# Patient Record
Sex: FEMALE | Race: OTHER
[De-identification: names, ages, dates, MRNs, and addresses within clinical notes are randomized per-mention and may not be internally consistent; named-entity substitution may affect disease eponyms.]

---

## 2021-06-28 ENCOUNTER — HOSPITAL ENCOUNTER (OUTPATIENT)
Dept: HOSPITAL 8 - RAD | Age: 41
Discharge: HOME | End: 2021-06-28
Attending: NURSE PRACTITIONER
Payer: COMMERCIAL

## 2021-06-28 DIAGNOSIS — M54.5: ICD-10-CM

## 2021-06-28 DIAGNOSIS — R31.9: Primary | ICD-10-CM

## 2021-06-28 PROCEDURE — 76770 US EXAM ABDO BACK WALL COMP: CPT

## 2023-10-14 ENCOUNTER — HOSPITAL ENCOUNTER (OUTPATIENT)
Dept: RADIOLOGY | Facility: MEDICAL CENTER | Age: 43
End: 2023-10-14
Attending: NURSE PRACTITIONER
Payer: COMMERCIAL

## 2023-10-14 DIAGNOSIS — N92.0 EXCESSIVE OR FREQUENT MENSTRUATION: ICD-10-CM

## 2023-10-14 PROCEDURE — 76830 TRANSVAGINAL US NON-OB: CPT

## 2023-12-29 ENCOUNTER — APPOINTMENT (OUTPATIENT)
Dept: ADMISSIONS | Facility: MEDICAL CENTER | Age: 43
End: 2023-12-29
Attending: SPECIALIST
Payer: COMMERCIAL

## 2024-01-03 NOTE — H&P
DATE OF ADMISSION:  2024     IDENTIFICATION:  The patient is a very pleasant 43-year-old primipara (para 1   with 1 previous  section).     CHIEF COMPLAINT:  The patient complains of menometrorrhagia/polymenorrhea.     HISTORY OF PRESENT ILLNESS:  The patient has for months now been complaining   of menometrorrhagia/polymenorrhea and pelvic ultrasound performed at [facility] on   10/14/2023 revealed a 2.2 cm submucosal fibroid and a 1.6 cm posterior   uterine wall intramural fibroid.  She is scheduled for a laparoscopic   supracervical hysterectomy with mini laparotomy and also bilateral   salpingectomy.     PAST MEDICAL HISTORY:  The patient has hypertension and hypertriglyceridemia   and also hypothyroidism.     PAST SURGICAL HISTORY:  The patient had a breast reduction and breast lift in   .  She has also undergone abdominoplasty.  She underwent  section   in .  She has had tooth extraction.     MEDICATIONS:  The patient takes hydrochlorothiazide and levothyroxine and   medication to treat hypertriglyceridemia.     ALLERGIES:  THE PATIENT SAYS SHE IS ALLERGIC TO IODINE BUT OTHERWISE HAS NO   KNOWN DRUG ALLERGIES.     SOCIAL HISTORY:  The patient denies smoking.  She only rarely consumes   alcoholic beverages.  She denies the use of recreational drugs.     REVIEW OF SYSTEMS:    GENERAL:  The patient denies any fevers or chills or sweats.  PULMONARY:  The patient denies any coughing or wheezing or chest pain or   shortness of breath.  CARDIOVASCULAR:  The patient denies any palpitations, dyspnea, chest pain.  GASTROINTESTINAL:  The patient denies any nausea, vomiting, diarrhea,   constipation.  GENITOURINARY:  The patient complains of menometrorrhagia/polymenorrhea.  MUSCULOSKELETAL:  The patient denies any arthralgias or myalgias.  NEUROLOGIC:  The patient denies any headaches or syncope or seizures.     PHYSICAL EXAMINATION:    VITAL SIGNS:  The patient's vital signs are stable and she  is afebrile.  Her   recent blood pressure 120/72.  Her height is 5 feet 5 inches and her weight is   176 pounds, which corresponds to a body mass index of 29.3.  GENERAL:  The patient appears well developed and well nourished and relaxed   and alert and comfortable and in no apparent distress.  HEENT:  Normocephalic, atraumatic.  Pupils are equal, round and reactive to   light and accommodation.  Extraocular motions intact.  Pharynx clear.  There   is no thyromegaly.  There is no cervical lymphadenopathy.  CHEST/HEART:  Regular rate and rhythm, no murmur.  LUNGS:  Clear to auscultation bilaterally.  ABDOMEN:  Examination of the patient's abdomen with the patient in the dorsal   supine position reveals surgical scars consistent with previous abdominoplasty   and reveals that the abdomen is soft and flat and nontender and nondistended.    There is no evidence of hepatomegaly and no evidence of splenomegaly.  EXTREMITIES:  No clubbing, cyanosis or edema.  NEUROLOGIC:  Nonfocal.     ASSESSMENT:  1.  Menorrhagia/menometrorrhagia/polymenorrhea.  2.  Submucosal uterine fibroid and also intramural uterine fibroid.     PLAN:  We will proceed with laparoscopic supracervical hysterectomy with mini   laparotomy and bilateral salpingectomy.  I have discussed with the patient and   explained to the patient in detail and at length what laparoscopic   supracervical hysterectomy with mini laparotomy and also bilateral   salpingectomy is and what laparoscopic supracervical hysterectomy with mini   laparotomy involves and I have discussed with the patient and explained to the   patient in detail and at length the risks and benefits and alternatives of   laparoscopic supracervical hysterectomy with mini laparotomy and bilateral   salpingectomy.  After our discussions and after answering her questions, she   told me that she very much wishes for us to proceed with laparoscopic   supracervical hysterectomy with mini laparotomy and  bilateral salpingectomy.        ______________________________  MD PATI Sheridan/JAZMIN    DD:  01/02/2024 17:50  DT:  01/02/2024 18:36    Job#:  [id number]

## 2024-01-04 ENCOUNTER — PRE-ADMISSION TESTING (OUTPATIENT)
Dept: ADMISSIONS | Facility: MEDICAL CENTER | Age: 44
End: 2024-01-04
Attending: SPECIALIST
Payer: COMMERCIAL

## 2024-01-04 DIAGNOSIS — Z01.810 PRE-OPERATIVE CARDIOVASCULAR EXAMINATION: ICD-10-CM

## 2024-01-04 DIAGNOSIS — Z01.812 PRE-OPERATIVE LABORATORY EXAMINATION: ICD-10-CM

## 2024-01-04 LAB
ANION GAP SERPL CALC-SCNC: 12 MMOL/L (ref 7–16)
BUN SERPL-MCNC: 14 MG/DL (ref 8–22)
CALCIUM SERPL-MCNC: 9.2 MG/DL (ref 8.5–10.5)
CHLORIDE SERPL-SCNC: 102 MMOL/L (ref 96–112)
CO2 SERPL-SCNC: 26 MMOL/L (ref 20–33)
CREAT SERPL-MCNC: 0.58 MG/DL (ref 0.5–1.4)
EKG IMPRESSION: NORMAL
ERYTHROCYTE [DISTWIDTH] IN BLOOD BY AUTOMATED COUNT: 45.5 FL (ref 35.9–50)
GFR SERPLBLD CREATININE-BSD FMLA CKD-EPI: 115 ML/MIN/1.73 M 2
GLUCOSE SERPL-MCNC: 75 MG/DL (ref 65–99)
HCT VFR BLD AUTO: 41.7 % (ref 37–47)
HGB BLD-MCNC: 13.7 G/DL (ref 12–16)
MCH RBC QN AUTO: 30.9 PG (ref 27–33)
MCHC RBC AUTO-ENTMCNC: 32.9 G/DL (ref 32.2–35.5)
MCV RBC AUTO: 93.9 FL (ref 81.4–97.8)
PLATELET # BLD AUTO: 368 K/UL (ref 164–446)
PMV BLD AUTO: 10 FL (ref 9–12.9)
POTASSIUM SERPL-SCNC: 3.9 MMOL/L (ref 3.6–5.5)
RBC # BLD AUTO: 4.44 M/UL (ref 4.2–5.4)
SODIUM SERPL-SCNC: 140 MMOL/L (ref 135–145)
WBC # BLD AUTO: 10.1 K/UL (ref 4.8–10.8)

## 2024-01-04 PROCEDURE — 93005 ELECTROCARDIOGRAM TRACING: CPT

## 2024-01-04 PROCEDURE — 36415 COLL VENOUS BLD VENIPUNCTURE: CPT

## 2024-01-04 PROCEDURE — 85027 COMPLETE CBC AUTOMATED: CPT

## 2024-01-04 PROCEDURE — 93010 ELECTROCARDIOGRAM REPORT: CPT | Performed by: INTERNAL MEDICINE

## 2024-01-04 PROCEDURE — 80048 BASIC METABOLIC PNL TOTAL CA: CPT

## 2024-01-04 RX ORDER — LOSARTAN POTASSIUM AND HYDROCHLOROTHIAZIDE 25; 100 MG/1; MG/1
1 TABLET ORAL
COMMUNITY
Start: 2023-12-01

## 2024-01-04 RX ORDER — FENOFIBRATE 160 MG/1
160 TABLET ORAL
COMMUNITY

## 2024-01-04 RX ORDER — NORETHINDRONE ACETATE AND ETHINYL ESTRADIOL, AND FERROUS FUMARATE 1MG-20(24)
1 KIT ORAL
Status: ON HOLD | COMMUNITY
Start: 2023-11-14 | End: 2024-01-08

## 2024-01-04 RX ORDER — VITAMIN B COMPLEX
1000 TABLET ORAL DAILY
COMMUNITY

## 2024-01-04 RX ORDER — LEVOTHYROXINE SODIUM 0.1 MG/1
100 TABLET ORAL
COMMUNITY

## 2024-01-08 ENCOUNTER — ANESTHESIA (OUTPATIENT)
Dept: SURGERY | Facility: MEDICAL CENTER | Age: 44
End: 2024-01-08
Payer: COMMERCIAL

## 2024-01-08 ENCOUNTER — HOSPITAL ENCOUNTER (OUTPATIENT)
Facility: MEDICAL CENTER | Age: 44
End: 2024-01-09
Attending: SPECIALIST | Admitting: SPECIALIST
Payer: COMMERCIAL

## 2024-01-08 ENCOUNTER — ANESTHESIA EVENT (OUTPATIENT)
Dept: SURGERY | Facility: MEDICAL CENTER | Age: 44
End: 2024-01-08
Payer: COMMERCIAL

## 2024-01-08 DIAGNOSIS — G89.18 POSTOPERATIVE PAIN: ICD-10-CM

## 2024-01-08 PROBLEM — Z90.711 STATUS POST LAPAROSCOPIC SUPRACERVICAL HYSTERECTOMY: Status: ACTIVE | Noted: 2024-01-08

## 2024-01-08 LAB
HCG UR QL: NEGATIVE
PATHOLOGY CONSULT NOTE: NORMAL

## 2024-01-08 PROCEDURE — 700105 HCHG RX REV CODE 258: Performed by: SPECIALIST

## 2024-01-08 PROCEDURE — 88307 TISSUE EXAM BY PATHOLOGIST: CPT

## 2024-01-08 PROCEDURE — 700101 HCHG RX REV CODE 250: Performed by: ANESTHESIOLOGY

## 2024-01-08 PROCEDURE — 700102 HCHG RX REV CODE 250 W/ 637 OVERRIDE(OP): Performed by: SPECIALIST

## 2024-01-08 PROCEDURE — 160035 HCHG PACU - 1ST 60 MINS PHASE I: Performed by: SPECIALIST

## 2024-01-08 PROCEDURE — 160009 HCHG ANES TIME/MIN: Performed by: SPECIALIST

## 2024-01-08 PROCEDURE — 700111 HCHG RX REV CODE 636 W/ 250 OVERRIDE (IP): Performed by: ANESTHESIOLOGY

## 2024-01-08 PROCEDURE — A9270 NON-COVERED ITEM OR SERVICE: HCPCS | Performed by: SPECIALIST

## 2024-01-08 PROCEDURE — G0378 HOSPITAL OBSERVATION PER HR: HCPCS

## 2024-01-08 PROCEDURE — 160002 HCHG RECOVERY MINUTES (STAT): Performed by: SPECIALIST

## 2024-01-08 PROCEDURE — 160048 HCHG OR STATISTICAL LEVEL 1-5: Performed by: SPECIALIST

## 2024-01-08 PROCEDURE — 160029 HCHG SURGERY MINUTES - 1ST 30 MINS LEVEL 4: Performed by: SPECIALIST

## 2024-01-08 PROCEDURE — 700101 HCHG RX REV CODE 250: Performed by: SPECIALIST

## 2024-01-08 PROCEDURE — 160041 HCHG SURGERY MINUTES - EA ADDL 1 MIN LEVEL 4: Performed by: SPECIALIST

## 2024-01-08 PROCEDURE — 81025 URINE PREGNANCY TEST: CPT

## 2024-01-08 RX ORDER — IBUPROFEN 400 MG/1
800 TABLET ORAL 3 TIMES DAILY PRN
Status: DISCONTINUED | OUTPATIENT
Start: 2024-01-13 | End: 2024-01-09 | Stop reason: HOSPADM

## 2024-01-08 RX ORDER — HALOPERIDOL 5 MG/ML
1 INJECTION INTRAMUSCULAR EVERY 6 HOURS PRN
Status: DISCONTINUED | OUTPATIENT
Start: 2024-01-08 | End: 2024-01-09 | Stop reason: HOSPADM

## 2024-01-08 RX ORDER — LIDOCAINE HYDROCHLORIDE 20 MG/ML
INJECTION, SOLUTION EPIDURAL; INFILTRATION; INTRACAUDAL; PERINEURAL PRN
Status: DISCONTINUED | OUTPATIENT
Start: 2024-01-08 | End: 2024-01-08 | Stop reason: SURG

## 2024-01-08 RX ORDER — EPHEDRINE SULFATE 50 MG/ML
5 INJECTION, SOLUTION INTRAVENOUS
Status: DISCONTINUED | OUTPATIENT
Start: 2024-01-08 | End: 2024-01-08 | Stop reason: HOSPADM

## 2024-01-08 RX ORDER — LABETALOL HYDROCHLORIDE 5 MG/ML
5 INJECTION, SOLUTION INTRAVENOUS
Status: DISCONTINUED | OUTPATIENT
Start: 2024-01-08 | End: 2024-01-08 | Stop reason: HOSPADM

## 2024-01-08 RX ORDER — IBUPROFEN 400 MG/1
800 TABLET ORAL 3 TIMES DAILY
Status: DISCONTINUED | OUTPATIENT
Start: 2024-01-08 | End: 2024-01-09 | Stop reason: HOSPADM

## 2024-01-08 RX ORDER — SODIUM CHLORIDE, SODIUM LACTATE, POTASSIUM CHLORIDE, CALCIUM CHLORIDE 600; 310; 30; 20 MG/100ML; MG/100ML; MG/100ML; MG/100ML
INJECTION, SOLUTION INTRAVENOUS CONTINUOUS
Status: DISCONTINUED | OUTPATIENT
Start: 2024-01-08 | End: 2024-01-08

## 2024-01-08 RX ORDER — ROCURONIUM BROMIDE 10 MG/ML
INJECTION, SOLUTION INTRAVENOUS PRN
Status: DISCONTINUED | OUTPATIENT
Start: 2024-01-08 | End: 2024-01-08 | Stop reason: SURG

## 2024-01-08 RX ORDER — KETOROLAC TROMETHAMINE 30 MG/ML
INJECTION, SOLUTION INTRAMUSCULAR; INTRAVENOUS PRN
Status: DISCONTINUED | OUTPATIENT
Start: 2024-01-08 | End: 2024-01-08 | Stop reason: SURG

## 2024-01-08 RX ORDER — EPINEPHRINE 1 MG/ML(1)
AMPUL (ML) INJECTION
Status: DISPENSED
Start: 2024-01-08 | End: 2024-01-08

## 2024-01-08 RX ORDER — ONDANSETRON 2 MG/ML
4 INJECTION INTRAMUSCULAR; INTRAVENOUS EVERY 4 HOURS PRN
Status: DISCONTINUED | OUTPATIENT
Start: 2024-01-08 | End: 2024-01-09 | Stop reason: HOSPADM

## 2024-01-08 RX ORDER — DIPHENHYDRAMINE HYDROCHLORIDE 50 MG/ML
12.5 INJECTION INTRAMUSCULAR; INTRAVENOUS
Status: DISCONTINUED | OUTPATIENT
Start: 2024-01-08 | End: 2024-01-08 | Stop reason: HOSPADM

## 2024-01-08 RX ORDER — CEFAZOLIN SODIUM 1 G/3ML
INJECTION, POWDER, FOR SOLUTION INTRAMUSCULAR; INTRAVENOUS PRN
Status: DISCONTINUED | OUTPATIENT
Start: 2024-01-08 | End: 2024-01-08 | Stop reason: SURG

## 2024-01-08 RX ORDER — POLYETHYLENE GLYCOL 3350 17 G/17G
1 POWDER, FOR SOLUTION ORAL 2 TIMES DAILY PRN
Status: DISCONTINUED | OUTPATIENT
Start: 2024-01-08 | End: 2024-01-09 | Stop reason: HOSPADM

## 2024-01-08 RX ORDER — DIPHENHYDRAMINE HYDROCHLORIDE 50 MG/ML
25 INJECTION INTRAMUSCULAR; INTRAVENOUS EVERY 6 HOURS PRN
Status: DISCONTINUED | OUTPATIENT
Start: 2024-01-08 | End: 2024-01-09 | Stop reason: HOSPADM

## 2024-01-08 RX ORDER — BISACODYL 10 MG
10 SUPPOSITORY, RECTAL RECTAL
Status: DISCONTINUED | OUTPATIENT
Start: 2024-01-08 | End: 2024-01-09 | Stop reason: HOSPADM

## 2024-01-08 RX ORDER — OXYCODONE HYDROCHLORIDE 5 MG/1
5 TABLET ORAL
Status: DISCONTINUED | OUTPATIENT
Start: 2024-01-08 | End: 2024-01-09 | Stop reason: HOSPADM

## 2024-01-08 RX ORDER — HYDROMORPHONE HYDROCHLORIDE 2 MG/ML
INJECTION, SOLUTION INTRAMUSCULAR; INTRAVENOUS; SUBCUTANEOUS PRN
Status: DISCONTINUED | OUTPATIENT
Start: 2024-01-08 | End: 2024-01-08 | Stop reason: SURG

## 2024-01-08 RX ORDER — MIDAZOLAM HYDROCHLORIDE 1 MG/ML
1 INJECTION INTRAMUSCULAR; INTRAVENOUS
Status: DISCONTINUED | OUTPATIENT
Start: 2024-01-08 | End: 2024-01-08 | Stop reason: HOSPADM

## 2024-01-08 RX ORDER — SIMETHICONE 125 MG
125 TABLET,CHEWABLE ORAL 3 TIMES DAILY PRN
Status: DISCONTINUED | OUTPATIENT
Start: 2024-01-08 | End: 2024-01-09 | Stop reason: HOSPADM

## 2024-01-08 RX ORDER — AMOXICILLIN 250 MG
1 CAPSULE ORAL NIGHTLY
Status: DISCONTINUED | OUTPATIENT
Start: 2024-01-08 | End: 2024-01-09 | Stop reason: HOSPADM

## 2024-01-08 RX ORDER — MORPHINE SULFATE 4 MG/ML
4 INJECTION INTRAVENOUS
Status: DISCONTINUED | OUTPATIENT
Start: 2024-01-08 | End: 2024-01-09 | Stop reason: HOSPADM

## 2024-01-08 RX ORDER — ACETAMINOPHEN 500 MG
1000 TABLET ORAL EVERY 6 HOURS PRN
Status: DISCONTINUED | OUTPATIENT
Start: 2024-01-13 | End: 2024-01-09 | Stop reason: HOSPADM

## 2024-01-08 RX ORDER — OXYCODONE HCL 5 MG/5 ML
5 SOLUTION, ORAL ORAL
Status: DISCONTINUED | OUTPATIENT
Start: 2024-01-08 | End: 2024-01-08 | Stop reason: HOSPADM

## 2024-01-08 RX ORDER — OXYCODONE HYDROCHLORIDE 10 MG/1
10 TABLET ORAL
Status: DISCONTINUED | OUTPATIENT
Start: 2024-01-08 | End: 2024-01-09 | Stop reason: HOSPADM

## 2024-01-08 RX ORDER — AMOXICILLIN 250 MG
1 CAPSULE ORAL
Status: DISCONTINUED | OUTPATIENT
Start: 2024-01-08 | End: 2024-01-09 | Stop reason: HOSPADM

## 2024-01-08 RX ORDER — DEXAMETHASONE SODIUM PHOSPHATE 4 MG/ML
INJECTION, SOLUTION INTRA-ARTICULAR; INTRALESIONAL; INTRAMUSCULAR; INTRAVENOUS; SOFT TISSUE PRN
Status: DISCONTINUED | OUTPATIENT
Start: 2024-01-08 | End: 2024-01-08 | Stop reason: SURG

## 2024-01-08 RX ORDER — OXYCODONE HCL 5 MG/5 ML
10 SOLUTION, ORAL ORAL
Status: DISCONTINUED | OUTPATIENT
Start: 2024-01-08 | End: 2024-01-08 | Stop reason: HOSPADM

## 2024-01-08 RX ORDER — MEPERIDINE HYDROCHLORIDE 25 MG/ML
6.25 INJECTION INTRAMUSCULAR; INTRAVENOUS; SUBCUTANEOUS
Status: DISCONTINUED | OUTPATIENT
Start: 2024-01-08 | End: 2024-01-08 | Stop reason: HOSPADM

## 2024-01-08 RX ORDER — ACETAMINOPHEN 500 MG
1000 TABLET ORAL EVERY 6 HOURS
Status: DISCONTINUED | OUTPATIENT
Start: 2024-01-08 | End: 2024-01-09 | Stop reason: HOSPADM

## 2024-01-08 RX ORDER — ONDANSETRON 2 MG/ML
INJECTION INTRAMUSCULAR; INTRAVENOUS PRN
Status: DISCONTINUED | OUTPATIENT
Start: 2024-01-08 | End: 2024-01-08 | Stop reason: SURG

## 2024-01-08 RX ORDER — HALOPERIDOL 5 MG/ML
1 INJECTION INTRAMUSCULAR
Status: DISCONTINUED | OUTPATIENT
Start: 2024-01-08 | End: 2024-01-08 | Stop reason: HOSPADM

## 2024-01-08 RX ORDER — SODIUM CHLORIDE, SODIUM LACTATE, POTASSIUM CHLORIDE, CALCIUM CHLORIDE 600; 310; 30; 20 MG/100ML; MG/100ML; MG/100ML; MG/100ML
INJECTION, SOLUTION INTRAVENOUS EVERY 6 HOURS
Status: COMPLETED | OUTPATIENT
Start: 2024-01-08 | End: 2024-01-08

## 2024-01-08 RX ORDER — DEXAMETHASONE SODIUM PHOSPHATE 4 MG/ML
4 INJECTION, SOLUTION INTRA-ARTICULAR; INTRALESIONAL; INTRAMUSCULAR; INTRAVENOUS; SOFT TISSUE
Status: DISCONTINUED | OUTPATIENT
Start: 2024-01-08 | End: 2024-01-09 | Stop reason: HOSPADM

## 2024-01-08 RX ORDER — HYDROMORPHONE HYDROCHLORIDE 1 MG/ML
0.4 INJECTION, SOLUTION INTRAMUSCULAR; INTRAVENOUS; SUBCUTANEOUS
Status: DISCONTINUED | OUTPATIENT
Start: 2024-01-08 | End: 2024-01-08 | Stop reason: HOSPADM

## 2024-01-08 RX ORDER — DEXMEDETOMIDINE HYDROCHLORIDE 100 UG/ML
INJECTION, SOLUTION INTRAVENOUS PRN
Status: DISCONTINUED | OUTPATIENT
Start: 2024-01-08 | End: 2024-01-08 | Stop reason: SURG

## 2024-01-08 RX ORDER — BUPIVACAINE HYDROCHLORIDE 2.5 MG/ML
INJECTION, SOLUTION EPIDURAL; INFILTRATION; INTRACAUDAL
Status: DISPENSED
Start: 2024-01-08 | End: 2024-01-08

## 2024-01-08 RX ORDER — HYDROMORPHONE HYDROCHLORIDE 1 MG/ML
0.1 INJECTION, SOLUTION INTRAMUSCULAR; INTRAVENOUS; SUBCUTANEOUS
Status: DISCONTINUED | OUTPATIENT
Start: 2024-01-08 | End: 2024-01-08 | Stop reason: HOSPADM

## 2024-01-08 RX ORDER — ENEMA 19; 7 G/133ML; G/133ML
1 ENEMA RECTAL
Status: DISCONTINUED | OUTPATIENT
Start: 2024-01-08 | End: 2024-01-09 | Stop reason: HOSPADM

## 2024-01-08 RX ORDER — BUPIVACAINE HYDROCHLORIDE AND EPINEPHRINE 2.5; 5 MG/ML; UG/ML
INJECTION, SOLUTION EPIDURAL; INFILTRATION; INTRACAUDAL; PERINEURAL
Status: DISCONTINUED | OUTPATIENT
Start: 2024-01-08 | End: 2024-01-08 | Stop reason: HOSPADM

## 2024-01-08 RX ORDER — DOCUSATE SODIUM 100 MG/1
100 CAPSULE, LIQUID FILLED ORAL 2 TIMES DAILY
Status: DISCONTINUED | OUTPATIENT
Start: 2024-01-08 | End: 2024-01-09 | Stop reason: HOSPADM

## 2024-01-08 RX ORDER — ONDANSETRON 2 MG/ML
4 INJECTION INTRAMUSCULAR; INTRAVENOUS
Status: DISCONTINUED | OUTPATIENT
Start: 2024-01-08 | End: 2024-01-08 | Stop reason: HOSPADM

## 2024-01-08 RX ORDER — SCOLOPAMINE TRANSDERMAL SYSTEM 1 MG/1
1 PATCH, EXTENDED RELEASE TRANSDERMAL
Status: DISCONTINUED | OUTPATIENT
Start: 2024-01-08 | End: 2024-01-09 | Stop reason: HOSPADM

## 2024-01-08 RX ORDER — HYDROMORPHONE HYDROCHLORIDE 1 MG/ML
0.2 INJECTION, SOLUTION INTRAMUSCULAR; INTRAVENOUS; SUBCUTANEOUS
Status: DISCONTINUED | OUTPATIENT
Start: 2024-01-08 | End: 2024-01-08 | Stop reason: HOSPADM

## 2024-01-08 RX ADMIN — LIDOCAINE HYDROCHLORIDE 100 MG: 20 INJECTION, SOLUTION EPIDURAL; INFILTRATION; INTRACAUDAL at 10:04

## 2024-01-08 RX ADMIN — DEXAMETHASONE SODIUM PHOSPHATE 8 MG: 4 INJECTION INTRA-ARTICULAR; INTRALESIONAL; INTRAMUSCULAR; INTRAVENOUS; SOFT TISSUE at 10:04

## 2024-01-08 RX ADMIN — ACETAMINOPHEN 1000 MG: 500 TABLET ORAL at 17:12

## 2024-01-08 RX ADMIN — HYDROMORPHONE HYDROCHLORIDE 0.4 MG: 2 INJECTION INTRAMUSCULAR; INTRAVENOUS; SUBCUTANEOUS at 11:06

## 2024-01-08 RX ADMIN — ONDANSETRON 4 MG: 2 INJECTION INTRAMUSCULAR; INTRAVENOUS at 10:04

## 2024-01-08 RX ADMIN — ROCURONIUM BROMIDE 25 MG: 50 INJECTION, SOLUTION INTRAVENOUS at 11:01

## 2024-01-08 RX ADMIN — DEXMEDETOMIDINE 20 MCG: 100 INJECTION, SOLUTION INTRAVENOUS at 12:20

## 2024-01-08 RX ADMIN — SODIUM CHLORIDE, POTASSIUM CHLORIDE, SODIUM LACTATE AND CALCIUM CHLORIDE: 600; 310; 30; 20 INJECTION, SOLUTION INTRAVENOUS at 09:58

## 2024-01-08 RX ADMIN — SODIUM CHLORIDE, POTASSIUM CHLORIDE, SODIUM LACTATE AND CALCIUM CHLORIDE: 600; 310; 30; 20 INJECTION, SOLUTION INTRAVENOUS at 17:14

## 2024-01-08 RX ADMIN — CEFAZOLIN 2 G: 1 INJECTION, POWDER, FOR SOLUTION INTRAMUSCULAR; INTRAVENOUS at 10:06

## 2024-01-08 RX ADMIN — PROPOFOL 140 MG: 10 INJECTION, EMULSION INTRAVENOUS at 10:04

## 2024-01-08 RX ADMIN — ROCURONIUM BROMIDE 5 MG: 50 INJECTION, SOLUTION INTRAVENOUS at 11:58

## 2024-01-08 RX ADMIN — KETOROLAC TROMETHAMINE 30 MG: 30 INJECTION, SOLUTION INTRAMUSCULAR; INTRAVENOUS at 11:58

## 2024-01-08 RX ADMIN — ACETAMINOPHEN 1000 MG: 500 TABLET ORAL at 23:16

## 2024-01-08 RX ADMIN — HYDROMORPHONE HYDROCHLORIDE 0.2 MG: 2 INJECTION INTRAMUSCULAR; INTRAVENOUS; SUBCUTANEOUS at 11:58

## 2024-01-08 RX ADMIN — ROCURONIUM BROMIDE 50 MG: 50 INJECTION, SOLUTION INTRAVENOUS at 10:04

## 2024-01-08 RX ADMIN — IBUPROFEN 800 MG: 400 TABLET, FILM COATED ORAL at 17:17

## 2024-01-08 RX ADMIN — HYDROMORPHONE HYDROCHLORIDE 0.4 MG: 2 INJECTION INTRAMUSCULAR; INTRAVENOUS; SUBCUTANEOUS at 10:08

## 2024-01-08 RX ADMIN — SUGAMMADEX 200 MG: 100 INJECTION, SOLUTION INTRAVENOUS at 12:32

## 2024-01-08 ASSESSMENT — PAIN DESCRIPTION - PAIN TYPE
TYPE: SURGICAL PAIN;ACUTE PAIN
TYPE: ACUTE PAIN;SURGICAL PAIN
TYPE: SURGICAL PAIN
TYPE: ACUTE PAIN;SURGICAL PAIN

## 2024-01-08 ASSESSMENT — COGNITIVE AND FUNCTIONAL STATUS - GENERAL
MOVING TO AND FROM BED TO CHAIR: A LITTLE
MOBILITY SCORE: 19
DAILY ACTIVITIY SCORE: 21
DRESSING REGULAR LOWER BODY CLOTHING: A LITTLE
CLIMB 3 TO 5 STEPS WITH RAILING: A LITTLE
HELP NEEDED FOR BATHING: A LITTLE
MOVING FROM LYING ON BACK TO SITTING ON SIDE OF FLAT BED: A LITTLE
SUGGESTED CMS G CODE MODIFIER DAILY ACTIVITY: CJ
WALKING IN HOSPITAL ROOM: A LITTLE
STANDING UP FROM CHAIR USING ARMS: A LITTLE
TOILETING: A LITTLE
SUGGESTED CMS G CODE MODIFIER MOBILITY: CK

## 2024-01-08 ASSESSMENT — LIFESTYLE VARIABLES
CONSUMPTION TOTAL: NEGATIVE
HAVE YOU EVER FELT YOU SHOULD CUT DOWN ON YOUR DRINKING: NO
EVER FELT BAD OR GUILTY ABOUT YOUR DRINKING: NO
AVERAGE NUMBER OF DAYS PER WEEK YOU HAVE A DRINK CONTAINING ALCOHOL: 0
EVER HAD A DRINK FIRST THING IN THE MORNING TO STEADY YOUR NERVES TO GET RID OF A HANGOVER: NO
DOES PATIENT WANT TO STOP DRINKING: NO
ALCOHOL_USE: NO
ON A TYPICAL DAY WHEN YOU DRINK ALCOHOL HOW MANY DRINKS DO YOU HAVE: 0
TOTAL SCORE: 0
HOW MANY TIMES IN THE PAST YEAR HAVE YOU HAD 5 OR MORE DRINKS IN A DAY: 0
TOTAL SCORE: 0
TOTAL SCORE: 0
HAVE PEOPLE ANNOYED YOU BY CRITICIZING YOUR DRINKING: NO

## 2024-01-08 ASSESSMENT — PAIN SCALES - GENERAL: PAIN_LEVEL: 2

## 2024-01-08 ASSESSMENT — PATIENT HEALTH QUESTIONNAIRE - PHQ9
1. LITTLE INTEREST OR PLEASURE IN DOING THINGS: NOT AT ALL
2. FEELING DOWN, DEPRESSED, IRRITABLE, OR HOPELESS: NOT AT ALL
SUM OF ALL RESPONSES TO PHQ9 QUESTIONS 1 AND 2: 0

## 2024-01-08 NOTE — ANESTHESIA PROCEDURE NOTES
Airway    Date/Time: 1/8/2024 10:05 AM    Performed by: Horacio Cooper M.D.  Authorized by: Horacio Cooper M.D.    Location:  OR  Urgency:  Elective  Indications for Airway Management:  Anesthesia      Spontaneous Ventilation: absent    Sedation Level:  Deep  Preoxygenated: Yes    Patient Position:  Sniffing  Mask Difficulty Assessment:  1 - vent by mask  Final Airway Type:  Endotracheal airway  Final Endotracheal Airway:  ETT  Cuffed: Yes    Technique Used for Successful ETT Placement:  Direct laryngoscopy    Insertion Site:  Oral  Blade Type:  Vines  Laryngoscope Blade/Videolaryngoscope Blade Size:  2  ETT Size (mm):  7.0  Measured from:  Lips  ETT to Lips (cm):  21  Placement Verified by: capnometry    Cormack-Lehane Classification:  Grade I - full view of glottis  Number of Attempts at Approach:  1

## 2024-01-08 NOTE — ANESTHESIA PREPROCEDURE EVALUATION
Case: [id number] Date/Time: 01/08/24 0945    Procedures:       LAPAROSCOPIC SUPRACERVICAL HYSTERECTOMY WITH MINI LAPAROTOMY, LAPAROSCOPIC BILATERAL SALPINGECTOMY AND ANY AND ALL OTHER INDICATED PROCEDURES      MINILAPAROTOMY    Pre-op diagnosis: N92.1, N94.5, D25.0    Location: [facility] ROOM 23 / SURGERY SAME DAY [facility]    Surgeons: Hernan Vega M.D.          43yoF with HTN, hypothyroidism    Allergies to iodine  Did not take home meds this am, instructed resuming meds tomorrow    NPO  No AC    Relevant Problems   No relevant active problems       Physical Exam    Airway   Mallampati: II       Cardiovascular - normal exam     Dental - normal exam           Pulmonary - normal exam     Abdominal - normal exam     Neurological - normal exam                   Anesthesia Plan    ASA 2       Plan - general       Airway plan will be ETT          Induction: intravenous    Postoperative Plan: Postoperative administration of opioids is intended.    Pertinent diagnostic labs and testing reviewed    Informed Consent:    Anesthetic plan and risks discussed with patient.

## 2024-01-08 NOTE — OR NURSING
1241 - Pt to PACU from OR. Report from anesthesia and OR RN. On 6L O2 via mask. Respirations even and unlabored. VSS. X4 lap sites CDI, soft, alanis catheter in place.    1325 - Patient waking up denies pain or nausea at this time,  at bedside. Patient drinking water without complications.    1329 - Hand off to Eleanor COMBS.

## 2024-01-08 NOTE — ANESTHESIA TIME REPORT
Anesthesia Start and Stop Event Times       Date Time Event    1/8/2024 0941 Ready for Procedure     0958 Anesthesia Start     1245 Anesthesia Stop          Responsible Staff  01/08/24      Name Role Begin End    Horacio Cooper M.D. Anesth 0958 1242          Overtime Reason:  no overtime (within assigned shift)    Comments:

## 2024-01-08 NOTE — ANESTHESIA POSTPROCEDURE EVALUATION
Patient: Collette Carty    Procedure Summary       Date: 01/08/24 Room / Location: [facility] ROOM 23 / SURGERY SAME DAY [facility]    Anesthesia Start: 0958 Anesthesia Stop: 1245    Procedures:       LAPAROSCOPIC SUPRACERVICAL HYSTERECTOMY WITH MINI LAPAROTOMY, LAPAROSCOPIC BILATERAL SALPINGECTOMY AND ANY AND ALL OTHER INDICATED PROCEDURES (Pelvis)      MINILAPAROTOMY (Pelvis) Diagnosis: (MENOMETRORRHAGIA, POLYMENORRHEA, AND UTERINE FIBROIDS)    Surgeons: Hernan Vega M.D. Responsible Provider: Horacio Cooper M.D.    Anesthesia Type: general ASA Status: 2            Final Anesthesia Type: general  Last vitals  BP   Blood Pressure: 101/50    Temp   36.8 °C (98.3 °F)    Pulse   92   Resp   20    SpO2   97 %      Anesthesia Post Evaluation    Patient location during evaluation: PACU  Patient participation: complete - patient participated  Level of consciousness: awake and alert  Pain score: 2    Airway patency: patent  Anesthetic complications: no  Cardiovascular status: adequate and hemodynamically stable  Respiratory status: acceptable  Hydration status: acceptable    PONV: none          No notable events documented.     Nurse Pain Score: 3 (NPRS)

## 2024-01-08 NOTE — OP REPORT
DATE OF SERVICE:  01/08/2024     PREOPERATIVE DIAGNOSES:  1.  Menorrhagia/menometrorrhagia/polymenorrhea.  2.  Submucosal uterine fibroid and also intramural uterine fibroid.     POSTOPERATIVE DIAGNOSES:  1.  Menorrhagia/menometrorrhagia/polymenorrhea.  2.  Submucosal uterine fibroid and also intramural uterine fibroid.     PROCEDURES:  Laparoscopic supracervical hysterectomy with mini laparotomy and   also bilateral salpingectomy.     SURGEON:  Hernan Vega MD     ASSISTANT:  Quynh Pena MD     ANESTHESIA:  General endotracheal tube anesthesia.     ANESTHESIOLOGIST:  Horacio Cooper MD     FINDINGS:  Speculum exam under anesthesia reveals no vulvar or vaginal or   cervical lesions and no cervical or vaginal discharge.  The cervix was well   visualized and appears nulliparous.  The uterus was sounded to 7.5 cm.  During   laparoscopy, excellent views of the pelvis are obtained.  During laparoscopy,   what appears to be a clip is seen on the left fallopian tube.  Otherwise,   both fallopian tubes appeared normal.  Both ovaries appeared normal.  The   cul-de-sac appears normal.  Serosal surfaces of the uterus appeared normal.    The liver edge appears normal.     SPECIMENS:  Uterine corpus and both fallopian tubes.     COMPLICATIONS:  None.     ESTIMATED BLOOD LOSS:  Less than 50 mL.     DESCRIPTION OF PROCEDURE:  After appropriate consents have been obtained, the   patient was taken to the operating room and given general anesthesia.  She was   prepped and draped in the dorsal lithotomy position and a Kelly catheter was   noted to be in place and draining urine.  It should be noted that immediately   after general anesthesia was induced, but before the patient was prepped and   draped, I performed a bimanual exam under anesthesia and a bimanual exam under   anesthesia reveals no evidence of any significant uterine enlargement and no   evidence of any adnexal masses either on the right or the left.     After  the patient was prepped and draped in the dorsal lithotomy position and   after a Kelly catheter was noted to be in place and draining urine, a speculum   exam was performed and revealed no vulvar or vaginal or cervical lesions and   no cervical or vaginal discharge.  The cervix was well visualized and appeared   nulliparous.  The anterior aspect of the cervix was grasped with a single   tooth tenaculum and the cervix was dilated with Hanks dilators.  The uterus   was then sounded to 7.5 cm.  A 6 cm ANNE-MARIE uterine manipulator was then inserted   through the endocervical canal into the intrauterine cavity and the balloon   at the tip of the ANNE-MARIE uterine manipulator was inflated with several mL of   water.  The single-tooth tenaculum was then removed from the cervix.  A gauze   sponge was then placed in the vaginal vault posterior to the ANNE-MARIE uterine   manipulator and left in place as the speculum was then removed.  The   's gloves were then changed.  Attention was then directed to the   abdomen where a small (approximately 1.5 cm) horizontal infraumbilical   incision was made with a scalpel after the overlying skin and subcutaneous   tissues had been infiltrated with local anesthetic.  This incision was made on   the scar, which was ostensibly from her previous   panniculectomy/abdominoplasty surgery.  A Veress needle was advanced through   this infraumbilical incision into the peritoneal cavity and proper placement   in the peritoneal cavity was verified with a Becker hanging drop technique.    The peritoneal cavity was then insufflated with approximately 2-3 liters of   carbon dioxide gas.  The Veress needle was removed and an 11 mm port was   introduced through the infraumbilical incision into the peritoneal cavity   utilizing the VersaStep trocar technique.  The central portion of this port   was removed and the 10 mm 0-degree laparoscope was inserted through the   remaining sleeve and proper entry in the  peritoneal cavity was verified   visually with laparoscope.  The patient was placed in Trendelenburg position.    The uterus was mobilized with the ANNE-MARIE uterine manipulator and findings were   as noted above and pictures were taken.  An 11 mm port was placed in left   lower quadrant under direct laparoscopic visualization after overlying skin   and subcutaneous tissues had been infiltrated with local anesthetic and   utilizing the VersaStep trocar technique.  The 11 mm port was placed in the   right lower quadrant under direct laparoscopic visualization after overlying   skin and subcutaneous tissue had been infiltrated with local anesthetic and   utilizing the VersaStep trocar technique.  An 11 mm port was placed   suprapubically under direct laparoscopic visualization after overlying skin   and subcutaneous tissue had been infiltrated with local anesthetic and   utilizing the VersaStep trocar technique and this suprapubic up incision and   port were placed in the middle of the patient's Pfannenstiel scar, which was   located inferior to the long horizontal scar from her panniculectomy.  At this   time, the 10 mm 0-degree laparoscope was exchanged for the 10 mm 30-degree   laparoscope.  The uterus was again mobilized and again findings are as noted   above and pictures were taken.  First, both fallopian tubes are removed   (bilateral salpingectomy was performed).  Attention was directed to the right   fallopian tube, was clearly identified and followed to its distal fimbriated   end and after the distal fimbriated end, the right fallopian tube was clearly   identified.  The right fallopian tube was resected (right salpingectomy was   performed) in the usual fashion by serially thoroughly sealing and incising   the right mesosalpinx from distal to proximal and then thoroughly cauterizing,   sealing, and incising the proximal right fallopian tube and the right   fallopian tube was then removed and submitted as a  specimen.  An examination   of the right adnexa revealed excellent hemostasis.  The same was then   performed on the left.  The left fallopian tube was clearly identified and   followed to its distal fimbriated end and after the distal fimbriated end, the   left fallopian tube was clearly identified and the left fallopian tube was   resected (left salpingectomy was performed) in the usual fashion by serially   thoroughly sealing and incising the left mesosalpinx from distal to proximal   using the Harmonic scalpel.  Then, the proximal left fallopian tube was   thoroughly sealed and incised with Harmonic scalpel and the left fallopian   tube was submitted as a specimen.  The left adnexa was examined and hemostasis   noted to be excellent.  It should be noted that by this time, the 10 mm   0-degree laparoscope had been exchanged for the 10 mm 30-degree laparoscope.    It should also be noted that this 10 mm 30-degree laparoscope was at different   times during the procedure placed through all 4 ports.  The uterus was again   mobilized.  Attention was directed to the right side of the uterus.  The right   proximal round ligament was thoroughly cauterized, sealed, and cut with the   LigaSure 5 mm bipolar cutting forceps instrument.  The right proximal round   ligament was thoroughly cauterized, sealed, and cut with the LigaSure   instrument.  The tissue between incision in the right proximal round ligament   and the right proper ovarian ligament was thoroughly cauterized, sealed and   cut with LigaSure instrument.  The most proximal portion of the right broad   ligament including anterior and posterior leaves and including ipsilateral   ascending branches of uterine vessels were serially thoroughly cauterized and   sealed with the LigaSure instrument and incised with Harmonic scalpel down to   around the level of the junction between the uterine corpus and uterine   cervix.  Remnants of anterior and posterior leaves of  the right broad ligament   were dissected out and ascending branches of uterine vessels on the right   were exposed and once exposed, thoroughly cauterized and sealed with LigaSure   instrument and cut with Harmonic scalpel.  Attention was directed to left side   of the uterus.  The left proximal round ligament was thoroughly cauterized   and sealed, and cut with LigaSure instrument.  The left proper ovarian   ligament was thoroughly cauterized, sealed, and cut with LigaSure instrument.    The tissue between the incision in the left proximal round ligament and left   proper ovarian ligament was thoroughly cauterized, sealed, and cut with   LigaSure instrument.  The most proximal portions of left broad ligament   including anterior and posterior leaves including ipsilateral ascending   branches of uterine vessels were serially thoroughly cauterized and sealed   with LigaSure instrument and cut with Harmonic scalpel.  Remnants of anterior   and posterior leaves of the right broad ligament were dissected out and   uterine vessels on the left were exposed and once exposed, thoroughly   cauterized and sealed with LigaSure and cut with Harmonic scalpel.  The serosa   overlying the anterior lower uterine segment was serially undermined and   incised with the Harmonic scalpel from left to right.  The bladder was   dissected away anteriorly mostly with blunt dissection, but at times with   Harmonic scalpel.  Attention was redirected to the right side of the uterus   and ascending branches of uterine vessels on the right were exposed and once   exposed, thoroughly cauterized and sealed with LigaSure instrument and cut   with Harmonic scalpel.  Attention was redirected to the left side of the   uterus and uterine vessels on the left were further exposed and once further   exposed, thoroughly cauterized and sealed with LigaSure instrument and cut   with Harmonic scalpel.  The uterine fundus was then examined and found to be    cyanotic indicating ischemia of the uterine corpus.  A Vicryl suture of 0   Vicryl on a CT1 needle was placed on the uterine fundus using intracorporeal   suturing technique and extracorporeal knot technique and after the needles cut   off and set aside, the 2 ends of the suture brought out through the right   lower quadrant port and clamped.  The uterine corpus was then amputated from   the uterine cervix at times with the monopolar cautery instrument, namely the   J hook and other times with the Harmonic scalpel and during this time, the   balloon at the tip of the ANNE-MARIE uterine manipulator was deflated and the ANNE-MARIE   uterine manipulator was brought into the endocervical canal and pushed out   through the endocervical canal through the internal cervical os into the   peritoneal cavity and the ANNE-MARIE uterine manipulator and graspers placed on the   uterine corpus used to create traction, countertraction to facilitate   separation of the uterine corpus from uterine cervix.  Once this was   accomplished, excellent hemostasis was observed and 3 interrupted simple   sutures of 0 Vicryl on CT1 needles were placed in the cervical stump using   intracorporeal suturing technique and extracorporeal knot technique and during   this process, the ANNE-MARIE uterine manipulator was completely removed.  The   cervical stump was examined and found to be nicely closed.  The pelvis was   copiously irrigated and drained and hemostasis was noted to be excellent.  The   2 ends of Vicryl suture on the fundus of the amputated uterine corpus were   brought out through the suprapubic port.  The laparoscope was removed and the   patient was taken out of Trendelenburg position and air was allowed to   evacuate the peritoneal cavity.  With the suprapubic port still in place, the   incision made suprapubically for placement of a suprapubic port was extended   bilaterally on the Pfannenstiel scar, thus creating an approximately 7 cm   horizontal  suprapubic mini laparotomy incision and this was created with a   scalpel.  This incision was then continued deeply through the subcutaneous   tissues using Bovie electrocautery and hemostasis maintained with Bovie   electrocautery.  The suprapubic port was then removed.  Retractors were used   to expose the entry in the anterior rectus fascia created by the suprapubic   port and this entry into the anterior rectus fascia was extended bilaterally   with Bovie electrocautery.  The superior aspect of the fascial incision was   doubly grasped with Kocher clamps and undermined from the underlying rectus   muscle with both blunt dissection and Bovie electrocautery and actually the   Harmonic scalpel.  Next, the inferior aspect of the fascial incision was   similarly doubly grasped with Kocher clamps and undermined from the underlying   rectus muscle with both blunt dissection and Bovie electrocautery and the   Harmonic scalpel.  Excellent hemostasis observed.  The entry created in the   midline of the rectus muscle by the suprapubic port was extended superiorly   and inferiorly with the Bovie electrocautery and the entry in the parietal   peritoneum created by the suprapubic port was extended with blunt dissection   and tension/traction was placed on the Vicryl sutures attached to the uterine   fundus and thus the serosa of the uterine fundus was brought to the mini   laparotomy incision and retractors were used to expose the uterine fundus and   the uterine fundus was then also grabbed with 2 towel clamps.  The amputated   uterine corpus was then easily delivered through the small suprapubic   horizontal mini laparotomy incision and the uterine corpus was submitted as a   specimen.  The parietal peritoneum underneath the mini laparotomy incision was   identified with use of retractors, reapproximated with simple continuous   suture using 3-0 Vicryl.  The incision in the rectus muscle underneath the   mini laparotomy  incision was exposed with retractors and reapproximated with   placement of 2 interrupted mattress sutures of 2-0 chromic.  The rectus fascia   underneath the mini laparotomy incision was identified and reapproximated   with simple continuous suture using 0 Vicryl.  The subcutaneous space   underneath the mini laparotomy incision was irrigated and drained with suction   irrigation instrument.  The peritoneal cavity was reinsufflated and   laparoscope was replaced through the infraumbilical port and the patient was   placed in Trendelenburg again and Prestige instrument was used to push the   bowel out of the pelvis and the suction irrigation instrument was used to   thoroughly irrigate and drain the pelvis and excellent hemostasis was noted   and pictures were taken.  The anterior abdominal wall at the site underneath   the mini laparotomy incision was examined and the peritoneum was found to be   nicely closed and hemostasis was noted to be excellent.  The pelvis was   copiously irrigated and drained and again hemostasis was noted to be   excellent.  The patient was taken out of Trendelenburg position and   laparoscope was removed and air was allowed to evacuate the peritoneal cavity   and the 3 remaining ports were removed.  The fascia underneath the   infraumbilical incision was identified with use of S retractors and   reapproximated with placement of simple interrupted suture using Vicryl.  The   small horizontal infraumbilical laparoscopic skin incisions were   reapproximated with placement of 3 interrupted buried sutures of 4-0 Monocryl   placed in the dermis.  The small left lower quadrant laparoscopic skin   incision was reapproximated with placement of 3 interrupted buried sutures of   4-0 Monocryl placed in the dermis.  The small right lower quadrant   laparoscopic skin incision was reapproximated with placement of 3 interrupted   buried sutures of 4-0 Monocryl placed in the dermis.  The small horizontal    suprapubic mini laparotomy skin incision was reapproximated with placement of   several interrupted buried sutures of 4-0 Monocryl placed in the dermis and at   least one set sutures placed for every 1 cm of length along the entire length   of the small horizontal suprapubic mini laparotomy skin incision.  The   vaginal gauze sponge was removed.  The procedure was terminated with final lap   and needle counts reported to be correct x2 at the end of the procedure.  The   patient tolerated the procedure well and sent to postanesthesia recovery in   stable condition.        ______________________________  Hernan Vega MD    MED/BALDO    DD:  01/08/2024 13:18  DT:  01/08/2024 15:08    Job#:  [id number]    CC:Quynh Pena MD

## 2024-01-08 NOTE — OR NURSING
1330- Report from GARRET Powell.  Pt resting comfortably.  Placed on 10L oxymask per [facility] protocol orders.  Pt denies pain.  Dressings are all CDI.  Kelyl cath in place, draining clear yellow urine.      1439- Report called to GARRET Bradley.  Pt placed on transport.   notified of room number.      1511- pt transported out of PACU and to room via transport.

## 2024-01-09 VITALS
RESPIRATION RATE: 17 BRPM | OXYGEN SATURATION: 95 % | HEIGHT: 64 IN | HEART RATE: 84 BPM | TEMPERATURE: 97.9 F | WEIGHT: 175.93 LBS | DIASTOLIC BLOOD PRESSURE: 88 MMHG | BODY MASS INDEX: 30.04 KG/M2 | SYSTOLIC BLOOD PRESSURE: 133 MMHG

## 2024-01-09 PROBLEM — N92.0 MENORRHAGIA: Status: ACTIVE | Noted: 2024-01-09

## 2024-01-09 PROBLEM — D25.9 UTERINE FIBROID: Status: ACTIVE | Noted: 2024-01-09

## 2024-01-09 LAB
BASOPHILS # BLD AUTO: 0.4 % (ref 0–1.8)
BASOPHILS # BLD: 0.04 K/UL (ref 0–0.12)
EOSINOPHIL # BLD AUTO: 0.06 K/UL (ref 0–0.51)
EOSINOPHIL NFR BLD: 0.5 % (ref 0–6.9)
ERYTHROCYTE [DISTWIDTH] IN BLOOD BY AUTOMATED COUNT: 44.1 FL (ref 35.9–50)
HCT VFR BLD AUTO: 38 % (ref 37–47)
HGB BLD-MCNC: 12.6 G/DL (ref 12–16)
IMM GRANULOCYTES # BLD AUTO: 0.08 K/UL (ref 0–0.11)
IMM GRANULOCYTES NFR BLD AUTO: 0.7 % (ref 0–0.9)
LYMPHOCYTES # BLD AUTO: 2.06 K/UL (ref 1–4.8)
LYMPHOCYTES NFR BLD: 18.5 % (ref 22–41)
MCH RBC QN AUTO: 30.9 PG (ref 27–33)
MCHC RBC AUTO-ENTMCNC: 33.2 G/DL (ref 32.2–35.5)
MCV RBC AUTO: 93.1 FL (ref 81.4–97.8)
MONOCYTES # BLD AUTO: 0.97 K/UL (ref 0–0.85)
MONOCYTES NFR BLD AUTO: 8.7 % (ref 0–13.4)
NEUTROPHILS # BLD AUTO: 7.95 K/UL (ref 1.82–7.42)
NEUTROPHILS NFR BLD: 71.2 % (ref 44–72)
NRBC # BLD AUTO: 0 K/UL
NRBC BLD-RTO: 0 /100 WBC (ref 0–0.2)
PLATELET # BLD AUTO: 304 K/UL (ref 164–446)
PMV BLD AUTO: 9.8 FL (ref 9–12.9)
RBC # BLD AUTO: 4.08 M/UL (ref 4.2–5.4)
WBC # BLD AUTO: 11.2 K/UL (ref 4.8–10.8)

## 2024-01-09 PROCEDURE — G0378 HOSPITAL OBSERVATION PER HR: HCPCS

## 2024-01-09 PROCEDURE — A9270 NON-COVERED ITEM OR SERVICE: HCPCS | Performed by: SPECIALIST

## 2024-01-09 PROCEDURE — 85025 COMPLETE CBC W/AUTO DIFF WBC: CPT

## 2024-01-09 PROCEDURE — 700102 HCHG RX REV CODE 250 W/ 637 OVERRIDE(OP): Performed by: SPECIALIST

## 2024-01-09 RX ORDER — OXYCODONE HYDROCHLORIDE AND ACETAMINOPHEN 5; 325 MG/1; MG/1
1 TABLET ORAL EVERY 6 HOURS PRN
Qty: 28 TABLET | Refills: 0 | Status: SHIPPED | OUTPATIENT
Start: 2024-01-09 | End: 2024-01-16

## 2024-01-09 RX ORDER — IBUPROFEN 800 MG/1
800 TABLET ORAL EVERY 8 HOURS PRN
Qty: 30 TABLET | Refills: 0 | Status: SHIPPED | OUTPATIENT
Start: 2024-01-09

## 2024-01-09 RX ADMIN — ACETAMINOPHEN 1000 MG: 500 TABLET ORAL at 06:11

## 2024-01-09 RX ADMIN — IBUPROFEN 800 MG: 400 TABLET, FILM COATED ORAL at 06:12

## 2024-01-09 ASSESSMENT — PAIN DESCRIPTION - PAIN TYPE
TYPE: ACUTE PAIN;SURGICAL PAIN

## 2024-01-09 NOTE — PROGRESS NOTES
Discharge instructions reviewed and signed, all questions answered, PIV removed, meds sent to outside pharmacy.

## 2024-01-09 NOTE — PROGRESS NOTES
4 Eyes Skin Assessment Completed by GARRET Ramachandran and GARRET Moser.    Head WDL  Ears WDL  Nose WDL  Mouth WDL  Neck WDL  Breast/Chest healed incisions to breasts  Shoulder Blades WDL  Spine WDL  (R) Arm/Elbow/Hand WDL  (L) Arm/Elbow/Hand WDL  Abdomen x4 incisions, dressing in place, old drainage noted  Groin WDL  Scrotum/Coccyx/Buttocks WDL  (R) Leg WDL  (L) Leg WDL  (R) Heel/Foot/Toe WDL  (L) Heel/Foot/Toe WDL          Devices In Places Blood Pressure Cuff, Pulse Ox, Kelly, and SCD's      Interventions In Place Pillows, Heels Loaded W/Pillows, and Pressure Redistribution Mattress    Possible Skin Injury No    Pictures Uploaded Into Epic N/A  Wound Consult Placed N/A  RN Wound Prevention Protocol Ordered No

## 2024-01-09 NOTE — CARE PLAN
The patient is Stable - Low risk of patient condition declining or worsening    Problem: Knowledge Deficit - Standard  Goal: Patient and family/care givers will demonstrate understanding of plan of care, disease process/condition, diagnostic tests and medications  Outcome: Progressing     Problem: Pain - Standard  Goal: Alleviation of pain or a reduction in pain to the patient’s comfort goal  Outcome: Progressing     Shift Goals  Clinical Goals: pain control, alanis care, rest  Patient Goals: rest, remove alanis    Progress made toward(s) clinical / shift goals:  Patient medicated per MAR. Alanis care provided during this shift. Alanis to be removed POD #1. Patient able to rest during this shift.    Patient is not progressing towards the following goals:

## 2024-01-09 NOTE — PROGRESS NOTES
The patient is today postoperative day number one status post laparoscopic supracervical hysterectomy with minilaparotomy and bilateral salpingectomy. She tells me this morning that other than for some abdominal soreness she feels fine and has no other problems or complaints pay she says she has been ambulating and tolerating oral fluids and she denies any nausea or vomiting. She says that her void catheter was removed earlier this morning. She has not yet been up to her bladder. She says she would like to go home today.  Vital signs: the patient's vital signs are stable and she is afebrile. The patient's temperature this morning was 36.8 degrees Celsius (disease 98.3 degrees Fahrenheit) and her heart rate this morning was 72 beats per minute and her respiratory rate this morning was 18 breaths permitted and her pulse oximetry this morning was 96 percent on room air and her blood pressure this morning was 110/74.  General: the patient appears well developed and well-nourished and relaxed and alert and comfortable and in no apparent distress.  Labs: the patient's preoperative hemoglobin was 13.7 grams per deciliter at her postoperative hemoglobin this morning was 12.6 grams per deciliter. Her white blood count this morning was 11.2 and are platelet count this morning was 304,000.  Assessment:  Postoperative day number one status post laparoscopic supracervical hysterectomy with minilaparotomy hand bilateral salpingectomy. The patient appears to be recovering appropriately. She would like to go home today.  Plan:  We will discharge home today with prescriptions for Percocet and ibuprofen and I asked her to follow up with me in the office in two weeks and to call or contact me at any time should she ever have any problems or questions or complaints and she said that she would do so.  Hernan Vega M.D.

## 2024-01-09 NOTE — PROGRESS NOTES
Bedside report received.  Assessment complete.    A&O x 4. Patient calls appropriately.  Patient ambulates with x1 to standby assist.    Patient has 0/10 pain. No pharmacological interventions provided at this time.  Denies N&V. Tolerating regular diet.  X4 incisions to abdomen, dressing in place, old drainage noted.  + void via alanis, - flatus, - BM, last BM PTA.  Patient denies SOB.  SCD's on.    Review plan with of care with patient. Call light and personal belongings within reach. Hourly rounding in place. All needs met at this time.

## 2024-09-01 ENCOUNTER — HOSPITAL ENCOUNTER (OUTPATIENT)
Dept: RADIOLOGY | Facility: MEDICAL CENTER | Age: 44
End: 2024-09-01
Attending: NURSE PRACTITIONER
Payer: COMMERCIAL

## 2024-09-01 DIAGNOSIS — R10.2 PELVIC AND PERINEAL PAIN: ICD-10-CM

## 2024-09-01 PROCEDURE — 76830 TRANSVAGINAL US NON-OB: CPT

## (undated) DEVICE — CANISTER SUCTION 3000ML MECHANICAL FILTER AUTO SHUTOFF MEDI-VAC NONSTERILE LF DISP  (40EA/CA)

## (undated) DEVICE — Device

## (undated) DEVICE — TOWEL STOP TIMEOUT SAFETY FLAG (40EA/CA)

## (undated) DEVICE — SENSOR OXIMETER ADULT SPO2 RD SET (20EA/BX)

## (undated) DEVICE — BLADE SURGICAL #15 - (50/BX 3BX/CA)

## (undated) DEVICE — TUBE CONNECTING SUCTION - CLEAR PLASTIC STERILE 72 IN (50EA/CA)

## (undated) DEVICE — SUTURE 0 VICRYL PLUS CT-1 - 36 INCH (36/BX)

## (undated) DEVICE — BANDAID SHEER STRIP 3/4 IN (100EA/BX 12BX/CA)

## (undated) DEVICE — GOWN SURGEONS X-LARGE - DISP. (30/CA)

## (undated) DEVICE — SLEEVE VASO CALF MED - (10PR/CA)

## (undated) DEVICE — SET LEADWIRE 5 LEAD BEDSIDE DISPOSABLE ECG (1SET OF 5/EA)

## (undated) DEVICE — SUTURE 0 VICRYL PLUS CT-1 - 8 X 18 INCH (12/BX)

## (undated) DEVICE — SUTURE 0 VICRYL PLUS UR-6 - 27 INCH (36/BX)

## (undated) DEVICE — DRESSING POST OP BORDER 4 X 10 (5EA/BX)

## (undated) DEVICE — TRAY FOLEY CATHETER STATLOCK 16FR SURESTEP  (10EA/CA)

## (undated) DEVICE — SUTURE 1 VICRYL PLUSCT-1 27IN - (36/BX)

## (undated) DEVICE — LIGASURE LAPAROSCOPIC 5MM - (6EA/CA)

## (undated) DEVICE — SUTURE 0 COATED VICRYL 6-18IN - (12PK/BX)

## (undated) DEVICE — SUTURE 3-0 VICRYL PLUS CT-1 - 36 INCH (36/BX)

## (undated) DEVICE — SUTURE 2-0 CHROMIC GUT CT-1 27 (36PK/BX)"

## (undated) DEVICE — UTERINE MANIP RUMI 6.7X6 - (5/BX)

## (undated) DEVICE — TROCAR STEP 11MM - (3/CA)

## (undated) DEVICE — TUBE ET 7.0 ORAL UNCUFFED SHERIDAN PERFORMED (10EA/BX)

## (undated) DEVICE — MASK OXYGEN VNYL ADLT MED CONC WITH 7 FOOT TUBING  - (50EA/CA)

## (undated) DEVICE — KIT  I.V. START (100EA/CA)

## (undated) DEVICE — GLOVE BIOGEL PI INDICATOR SZ 7.0 SURGICAL PF LF - (50/BX 4BX/CA)

## (undated) DEVICE — TUBING LAPAROSCOPIC PLUME DEVICE (10EA/CA)

## (undated) DEVICE — DRAPESURG STERI-DRAPE LONG - (10/BX 4BX/CA)

## (undated) DEVICE — LACTATED RINGERS INJ 1000 ML - (14EA/CA 60CA/PF)

## (undated) DEVICE — SUCTION INSTRUMENT YANKAUER BULBOUS TIP W/O VENT (50EA/CA)

## (undated) DEVICE — CANNULA O2 COMFORT SOFT EAR ADULT 7 FT TUBING (50/CA)

## (undated) DEVICE — SODIUM CHL IRRIGATION 0.9% 1000ML (12EA/CA)

## (undated) DEVICE — TUBING CLEARLINK DUO-VENT - C-FLO (48EA/CA)

## (undated) DEVICE — GLOVE BIOGEL SZ 7.5 SURGICAL PF LTX - (50PR/BX 4BX/CA)

## (undated) DEVICE — CANISTER SUCTION RIGID RED 1500CC (40EA/CA)

## (undated) DEVICE — SET SUCTION/IRRIGATION WITH DISPOSABLE TIP (6/CA )PART #0250-070-520 IS A SUB

## (undated) DEVICE — SUTURE 4-0 MONOCRYL PLUS PS-2 - 27 INCH (36/BX)

## (undated) DEVICE — PENCIL ELECTSURG 10FT BTN SWH - (50/CA)

## (undated) DEVICE — NEEDLE INSUFFLATION FOR STEP - (12/BX)

## (undated) DEVICE — GOWN WARMING STANDARD FLEX - (30/CA)

## (undated) DEVICE — DRESSING TRANSPARENT FILM TEGADERM 2.375 X 2.75"  (100EA/BX)"

## (undated) DEVICE — PAD SANITARY 11IN MAXI IND WRAPPED  (12EA/PK 24PK/CA)

## (undated) DEVICE — SLEEVE STERILE  A599T - 30/BX 2BX/CS

## (undated) DEVICE — SEALER VESSEL HARMONIC ACE PLUS WITH ADVANCED HEMOSTASIS 36CM (1/EA)